# Patient Record
Sex: MALE | Race: WHITE | Employment: UNEMPLOYED | URBAN - METROPOLITAN AREA
[De-identification: names, ages, dates, MRNs, and addresses within clinical notes are randomized per-mention and may not be internally consistent; named-entity substitution may affect disease eponyms.]

---

## 2018-02-22 ENCOUNTER — APPOINTMENT (OUTPATIENT)
Dept: GENERAL RADIOLOGY | Facility: HOSPITAL | Age: 33
End: 2018-02-22
Attending: EMERGENCY MEDICINE
Payer: COMMERCIAL

## 2018-02-22 PROCEDURE — 73100 X-RAY EXAM OF WRIST: CPT | Performed by: EMERGENCY MEDICINE

## 2018-02-22 NOTE — ED PROVIDER NOTES
Patient Seen in: [facility] AND [facility] Emergency Department    History   Patient presents with:  Alcohol Intoxication (neurologic)  Upper Extremity Injury (musculoskeletal)    Stated Complaint:     HPI    58-year-old male without known history brought by ely no guarding. Musculoskeletal: No obvious deformity or signs of swelling or ecchymosis the right wrist at this time   neurological: Unable to fully assess orientation at this time  Skin: Skin is warm and dry.    Psychiatric: Patient extremely combative, ye diagnosis)  Sprain of right wrist, initial encounter    Disposition:  There is no disposition on file for this visit. There is no disposition time on file for this visit.     Follow-up:  Gloria Pepper MD  [address] [place] [phone]

## 2018-02-22 NOTE — ED INITIAL ASSESSMENT (HPI)
Patient under PD custody, in restraints on arrival. C/o right wrist injury from police being \"too rough with me and their handcuffs\". Patient denies any other drug use this evening. Patient with unpredictable mood swings.

## 2018-02-22 NOTE — ED PROVIDER NOTES
Pt endorsed to me by Dr. Pernell Ahumada - Pt awake, tolerating PO. Police at bedside. Pt to be discharged to police custody.

## 2019-08-27 ENCOUNTER — HOSPITAL (OUTPATIENT)
Dept: OTHER | Age: 34
End: 2019-08-27
Attending: EMERGENCY MEDICINE

## (undated) NOTE — ED AVS SNAPSHOT
Andrewrimma Emelia   MRN: [id number]    Department:  [facility] Emergency Department   Date of Visit:  2/22/2018           Disclosure     Insurance plans vary and the physician(s) referred by the ER may not be covered by your plan.  Please contact y CARE PHYSICIAN AT ONCE OR RETURN IMMEDIATELY TO THE EMERGENCY DEPARTMENT. If you have been prescribed any medication(s), please fill your prescription right away and begin taking the medication(s) as directed.   If you believe that any of the medications

## (undated) NOTE — LETTER
[address] [facility].   [address] [place] [address]  Dept: [phone]  Dept Fax: [phone]: [phone]    Date: 02/22/18       I have seen Darion Hoyt 9/16/1985 and found him medically cleared for incarceration